# Patient Record
Sex: MALE | Race: WHITE | NOT HISPANIC OR LATINO | Employment: FULL TIME | URBAN - NONMETROPOLITAN AREA
[De-identification: names, ages, dates, MRNs, and addresses within clinical notes are randomized per-mention and may not be internally consistent; named-entity substitution may affect disease eponyms.]

---

## 2023-07-28 PROBLEM — E55.9 VITAMIN D DEFICIENCY: Status: ACTIVE | Noted: 2023-07-28

## 2023-07-28 PROBLEM — F31.9 BIPOLAR DISEASE, CHRONIC (MULTI): Status: ACTIVE | Noted: 2023-07-28

## 2023-07-28 PROBLEM — G40.909 EPILEPSY, UNSPECIFIED, NOT INTRACTABLE, WITHOUT STATUS EPILEPTICUS (MULTI): Status: ACTIVE | Noted: 2023-07-28

## 2023-07-28 PROBLEM — E87.1 HYPONATREMIA: Status: ACTIVE | Noted: 2023-07-28

## 2023-07-28 PROBLEM — F41.9 ANXIETY DISORDER: Status: ACTIVE | Noted: 2023-07-28

## 2023-08-22 ENCOUNTER — OFFICE VISIT (OUTPATIENT)
Dept: PRIMARY CARE | Facility: CLINIC | Age: 48
End: 2023-08-22
Payer: COMMERCIAL

## 2023-08-22 ENCOUNTER — LAB (OUTPATIENT)
Dept: LAB | Facility: LAB | Age: 48
End: 2023-08-22
Payer: COMMERCIAL

## 2023-08-22 VITALS
SYSTOLIC BLOOD PRESSURE: 145 MMHG | TEMPERATURE: 97.7 F | HEART RATE: 90 BPM | HEIGHT: 72 IN | OXYGEN SATURATION: 99 % | BODY MASS INDEX: 36.65 KG/M2 | DIASTOLIC BLOOD PRESSURE: 90 MMHG | WEIGHT: 270.6 LBS

## 2023-08-22 DIAGNOSIS — R94.31 ABNORMAL EKG: ICD-10-CM

## 2023-08-22 DIAGNOSIS — Z13.6 ENCOUNTER FOR SCREENING FOR CORONARY ARTERY DISEASE: ICD-10-CM

## 2023-08-22 DIAGNOSIS — F31.9 BIPOLAR DISEASE, CHRONIC (MULTI): ICD-10-CM

## 2023-08-22 DIAGNOSIS — Z00.00 ANNUAL PHYSICAL EXAM: Primary | ICD-10-CM

## 2023-08-22 DIAGNOSIS — G40.909 NONINTRACTABLE EPILEPSY WITHOUT STATUS EPILEPTICUS, UNSPECIFIED EPILEPSY TYPE (MULTI): ICD-10-CM

## 2023-08-22 DIAGNOSIS — Z00.00 ANNUAL PHYSICAL EXAM: ICD-10-CM

## 2023-08-22 DIAGNOSIS — I10 UNCONTROLLED HYPERTENSION: ICD-10-CM

## 2023-08-22 LAB
ALANINE AMINOTRANSFERASE (SGPT) (U/L) IN SER/PLAS: 26 U/L (ref 10–52)
ALBUMIN (G/DL) IN SER/PLAS: 4.5 G/DL (ref 3.4–5)
ALKALINE PHOSPHATASE (U/L) IN SER/PLAS: 68 U/L (ref 33–120)
ANION GAP IN SER/PLAS: 11 MMOL/L (ref 10–20)
ASPARTATE AMINOTRANSFERASE (SGOT) (U/L) IN SER/PLAS: 23 U/L (ref 9–39)
BASOPHILS (10*3/UL) IN BLOOD BY AUTOMATED COUNT: 0.04 X10E9/L (ref 0–0.1)
BASOPHILS/100 LEUKOCYTES IN BLOOD BY AUTOMATED COUNT: 0.7 % (ref 0–2)
BILIRUBIN TOTAL (MG/DL) IN SER/PLAS: 0.5 MG/DL (ref 0–1.2)
CALCIUM (MG/DL) IN SER/PLAS: 9 MG/DL (ref 8.6–10.3)
CARBON DIOXIDE, TOTAL (MMOL/L) IN SER/PLAS: 29 MMOL/L (ref 21–32)
CHLORIDE (MMOL/L) IN SER/PLAS: 98 MMOL/L (ref 98–107)
CHOLESTEROL (MG/DL) IN SER/PLAS: 204 MG/DL (ref 0–199)
CHOLESTEROL IN HDL (MG/DL) IN SER/PLAS: 66.3 MG/DL
CHOLESTEROL/HDL RATIO: 3.1
CREATININE (MG/DL) IN SER/PLAS: 0.76 MG/DL (ref 0.5–1.3)
EOSINOPHILS (10*3/UL) IN BLOOD BY AUTOMATED COUNT: 0.11 X10E9/L (ref 0–0.7)
EOSINOPHILS/100 LEUKOCYTES IN BLOOD BY AUTOMATED COUNT: 1.9 % (ref 0–6)
ERYTHROCYTE DISTRIBUTION WIDTH (RATIO) BY AUTOMATED COUNT: 12.4 % (ref 11.5–14.5)
ERYTHROCYTE MEAN CORPUSCULAR HEMOGLOBIN CONCENTRATION (G/DL) BY AUTOMATED: 34.1 G/DL (ref 32–36)
ERYTHROCYTE MEAN CORPUSCULAR VOLUME (FL) BY AUTOMATED COUNT: 92 FL (ref 80–100)
ERYTHROCYTES (10*6/UL) IN BLOOD BY AUTOMATED COUNT: 4.64 X10E12/L (ref 4.5–5.9)
ESTIMATED AVERAGE GLUCOSE FOR HBA1C: 105 MG/DL
GFR MALE: >90 ML/MIN/1.73M2
GLUCOSE (MG/DL) IN SER/PLAS: 109 MG/DL (ref 74–99)
HEMATOCRIT (%) IN BLOOD BY AUTOMATED COUNT: 42.8 % (ref 41–52)
HEMOGLOBIN (G/DL) IN BLOOD: 14.6 G/DL (ref 13.5–17.5)
HEMOGLOBIN A1C/HEMOGLOBIN TOTAL IN BLOOD: 5.3 %
IMMATURE GRANULOCYTES/100 LEUKOCYTES IN BLOOD BY AUTOMATED COUNT: 0.5 % (ref 0–0.9)
LDL: 114 MG/DL (ref 0–99)
LEUKOCYTES (10*3/UL) IN BLOOD BY AUTOMATED COUNT: 5.9 X10E9/L (ref 4.4–11.3)
LYMPHOCYTES (10*3/UL) IN BLOOD BY AUTOMATED COUNT: 1.19 X10E9/L (ref 1.2–4.8)
LYMPHOCYTES/100 LEUKOCYTES IN BLOOD BY AUTOMATED COUNT: 20 % (ref 13–44)
MAGNESIUM (MG/DL) IN SER/PLAS: 2.17 MG/DL (ref 1.6–2.4)
MONOCYTES (10*3/UL) IN BLOOD BY AUTOMATED COUNT: 0.47 X10E9/L (ref 0.1–1)
MONOCYTES/100 LEUKOCYTES IN BLOOD BY AUTOMATED COUNT: 7.9 % (ref 2–10)
NEUTROPHILS (10*3/UL) IN BLOOD BY AUTOMATED COUNT: 4.1 X10E9/L (ref 1.2–7.7)
NEUTROPHILS/100 LEUKOCYTES IN BLOOD BY AUTOMATED COUNT: 69 % (ref 40–80)
PLATELETS (10*3/UL) IN BLOOD AUTOMATED COUNT: 253 X10E9/L (ref 150–450)
POTASSIUM (MMOL/L) IN SER/PLAS: 4.4 MMOL/L (ref 3.5–5.3)
PROTEIN TOTAL: 7.2 G/DL (ref 6.4–8.2)
SODIUM (MMOL/L) IN SER/PLAS: 134 MMOL/L (ref 136–145)
THYROTROPIN (MIU/L) IN SER/PLAS BY DETECTION LIMIT <= 0.05 MIU/L: 2.3 MIU/L (ref 0.44–3.98)
TRIGLYCERIDE (MG/DL) IN SER/PLAS: 117 MG/DL (ref 0–149)
UREA NITROGEN (MG/DL) IN SER/PLAS: 12 MG/DL (ref 6–23)
VLDL: 23 MG/DL (ref 0–40)

## 2023-08-22 PROCEDURE — 99214 OFFICE O/P EST MOD 30 MIN: CPT | Performed by: INTERNAL MEDICINE

## 2023-08-22 PROCEDURE — 84443 ASSAY THYROID STIM HORMONE: CPT

## 2023-08-22 PROCEDURE — 83036 HEMOGLOBIN GLYCOSYLATED A1C: CPT

## 2023-08-22 PROCEDURE — 80061 LIPID PANEL: CPT

## 2023-08-22 PROCEDURE — 93000 ELECTROCARDIOGRAM COMPLETE: CPT | Performed by: INTERNAL MEDICINE

## 2023-08-22 PROCEDURE — 99396 PREV VISIT EST AGE 40-64: CPT | Performed by: INTERNAL MEDICINE

## 2023-08-22 PROCEDURE — 1036F TOBACCO NON-USER: CPT | Performed by: INTERNAL MEDICINE

## 2023-08-22 PROCEDURE — 3080F DIAST BP >= 90 MM HG: CPT | Performed by: INTERNAL MEDICINE

## 2023-08-22 PROCEDURE — 83735 ASSAY OF MAGNESIUM: CPT

## 2023-08-22 PROCEDURE — 3077F SYST BP >= 140 MM HG: CPT | Performed by: INTERNAL MEDICINE

## 2023-08-22 PROCEDURE — 84153 ASSAY OF PSA TOTAL: CPT

## 2023-08-22 PROCEDURE — 85025 COMPLETE CBC W/AUTO DIFF WBC: CPT

## 2023-08-22 PROCEDURE — 36415 COLL VENOUS BLD VENIPUNCTURE: CPT

## 2023-08-22 PROCEDURE — 80053 COMPREHEN METABOLIC PANEL: CPT

## 2023-08-22 RX ORDER — CARBAMAZEPINE 400 MG/1
400 TABLET, EXTENDED RELEASE ORAL 3 TIMES DAILY
Qty: 270 TABLET | Refills: 1 | Status: SHIPPED | OUTPATIENT
Start: 2023-08-22 | End: 2023-11-20 | Stop reason: SDUPTHER

## 2023-08-22 ASSESSMENT — ANXIETY QUESTIONNAIRES
GAD7 TOTAL SCORE: 0
6. BECOMING EASILY ANNOYED OR IRRITABLE: NOT AT ALL
1. FEELING NERVOUS, ANXIOUS, OR ON EDGE: NOT AT ALL
4. TROUBLE RELAXING: NOT AT ALL
7. FEELING AFRAID AS IF SOMETHING AWFUL MIGHT HAPPEN: NOT AT ALL
3. WORRYING TOO MUCH ABOUT DIFFERENT THINGS: NOT AT ALL
2. NOT BEING ABLE TO STOP OR CONTROL WORRYING: NOT AT ALL
5. BEING SO RESTLESS THAT IT IS HARD TO SIT STILL: NOT AT ALL

## 2023-08-22 ASSESSMENT — ENCOUNTER SYMPTOMS
LOSS OF SENSATION IN FEET: 0
DIARRHEA: 0
SORE THROAT: 0
UNEXPECTED WEIGHT CHANGE: 0
BLOOD IN STOOL: 0
DIZZINESS: 0
FATIGUE: 0
ARTHRALGIAS: 0
OCCASIONAL FEELINGS OF UNSTEADINESS: 0
ABDOMINAL PAIN: 0
PALPITATIONS: 0
BRUISES/BLEEDS EASILY: 0
WHEEZING: 0
FEVER: 0
COUGH: 0
SINUS PAIN: 0
HEADACHES: 0
DEPRESSION: 0
DIFFICULTY URINATING: 0

## 2023-08-22 ASSESSMENT — PATIENT HEALTH QUESTIONNAIRE - PHQ9
2. FEELING DOWN, DEPRESSED OR HOPELESS: NOT AT ALL
SUM OF ALL RESPONSES TO PHQ9 QUESTIONS 1 AND 2: 0
1. LITTLE INTEREST OR PLEASURE IN DOING THINGS: NOT AT ALL

## 2023-08-22 NOTE — PROGRESS NOTES
"Subjective   Patient ID: Clarence Aguilar is a 48 y.o. male who presents for Annual Exam and Med Refill.    \" Annual preventive visit  -Recent blood work reviewed from February 2023 borderline elevated liver enzymes hypercholesterolemia counseled about weight loss and low-carb diet  -Class II obesity worsening BMI patient counseled about weight loss low-carb diet exercise  -Vaccinations patient counseled about shingles vaccine patient declined at this time we will readdress again next appointment  -Counseled about the need for screening colonoscopy    Follow-up  Blood work reviewed  -Uncontrolled hypertension patient needs a goal blood pressure 120/80 patient declined treatment at this time  Counseled about obesity counseled about weight loss  EKG today sinus rhythm no ischemic changes nonspecific RSR changes in the anterior leads and the horizontal axis deviation talked about obesity and weight loss  Proceed with 2D echo  Need to proceed with cardiac scoring for further recommendation  Recheck patient blood pressure in 3 months  - Hyponatremia compensated continue with current fluid restriction continue with current medication  -\"Obesity counseled about BMI comes about weight loss  -Borderline high blood sugar controlled with diet continue monitoring, follow-up hemoglobin A1c  -Vitamin D very low 27 on vitamin D 5000 daily, follow-up vitamin D 11  -Anxiety and depression now controlled and not taking any medication no recurrence  -Bipolar depression compensated  -Seizures, controlled with carbamazepine no recurrence white count and blood counts normal range   Follow-up 3 months      Med Refill  Pertinent negatives include no abdominal pain, arthralgias, chest pain, congestion, coughing, fatigue, fever, headaches, rash or sore throat.          Review of Systems   Constitutional:  Negative for fatigue, fever and unexpected weight change.   HENT:  Negative for congestion, ear discharge, ear pain, mouth sores, sinus pain " "and sore throat.    Eyes:  Negative for visual disturbance.   Respiratory:  Negative for cough and wheezing.    Cardiovascular:  Negative for chest pain, palpitations and leg swelling.   Gastrointestinal:  Negative for abdominal pain, blood in stool and diarrhea.   Genitourinary:  Negative for difficulty urinating.   Musculoskeletal:  Negative for arthralgias.   Skin:  Negative for rash.   Neurological:  Negative for dizziness and headaches.   Hematological:  Does not bruise/bleed easily.   Psychiatric/Behavioral:  Negative for behavioral problems.    All other systems reviewed and are negative.      Objective   No results found for: \"HGBA1C\"   /90 (BP Location: Right arm, Patient Position: Sitting)   Pulse 90   Temp 36.5 °C (97.7 °F)   Ht 1.816 m (5' 11.5\")   Wt 123 kg (270 lb 9.6 oz)   SpO2 99%   BMI 37.22 kg/m²   Lab Results   Component Value Date    WBC 5.9 08/22/2023    HGB 14.6 08/22/2023    HCT 42.8 08/22/2023     08/22/2023    CHOL 204 (H) 08/22/2023    TRIG 117 08/22/2023    HDL 66.3 08/22/2023    ALT 26 08/22/2023    AST 23 08/22/2023     (L) 08/22/2023    K 4.4 08/22/2023    CL 98 08/22/2023    CREATININE 0.76 08/22/2023    BUN 12 08/22/2023    CO2 29 08/22/2023    TSH 2.30 08/22/2023     par   Physical Exam  Vitals and nursing note reviewed.   Constitutional:       Appearance: Normal appearance. He is obese.   HENT:      Head: Normocephalic.      Nose: Nose normal.   Eyes:      Conjunctiva/sclera: Conjunctivae normal.      Pupils: Pupils are equal, round, and reactive to light.   Cardiovascular:      Rate and Rhythm: Regular rhythm.      Heart sounds: No murmur heard.     Friction rub present. No gallop.   Pulmonary:      Effort: Pulmonary effort is normal.      Breath sounds: Normal breath sounds.   Abdominal:      General: Abdomen is flat.      Palpations: Abdomen is soft.   Musculoskeletal:      Cervical back: Neck supple.   Skin:     General: Skin is warm.      Findings: No " "rash.   Neurological:      General: No focal deficit present.      Mental Status: He is oriented to person, place, and time.   Psychiatric:         Mood and Affect: Mood normal.         Assessment/Plan   Clarence was seen today for annual exam and med refill.  Diagnoses and all orders for this visit:  Annual physical exam (Primary)  -     CBC and Auto Differential; Future  -     Comprehensive Metabolic Panel; Future  -     Hemoglobin A1C; Future  -     Lipid Panel; Future  -     Magnesium; Future  -     Prostate Specific Antigen; Future  -     TSH with reflex to Free T4 if abnormal; Future  -     ECG 12 lead  Nonintractable epilepsy without status epilepticus, unspecified epilepsy type (CMS/HCC)  -     carBAMazepine XR (TEGretol  XR) 400 mg 12 hr tablet; Take 1 tablet (400 mg) by mouth 3 times a day.  Bipolar disease, chronic (CMS/HCC)  Encounter for screening for coronary artery disease  -     CT cardiac scoring wo IV contrast; Future  -     ECG 12 lead  -     CT cardiac scoring wo IV contrast  Uncontrolled hypertension  -     Transthoracic Echo (TTE) Complete; Future  -     ECG 12 lead  Abnormal EKG  -     Transthoracic Echo (TTE) Complete; Future   \" Annual preventive visit  -Recent blood work reviewed from February 2023 borderline elevated liver enzymes hypercholesterolemia counseled about weight loss and low-carb diet  -Class II obesity worsening BMI patient counseled about weight loss low-carb diet exercise  -Vaccinations patient counseled about shingles vaccine patient declined at this time we will readdress again next appointment  -Counseled about the need for screening colonoscopy    Follow-up  Blood work reviewed  -Uncontrolled hypertension patient needs a goal blood pressure 120/80 patient declined treatment at this time  Counseled about obesity counseled about weight loss  EKG today sinus rhythm no ischemic changes nonspecific RSR changes in the anterior leads and the horizontal axis deviation talked about " "obesity and weight loss  Proceed with 2D echo  Need to proceed with cardiac scoring for further recommendation  Recheck patient blood pressure in 3 months  - Hyponatremia compensated continue with current fluid restriction continue with current medication  -\"Obesity counseled about BMI comes about weight loss  -Borderline high blood sugar controlled with diet continue monitoring, follow-up hemoglobin A1c  -Vitamin D very low 27 on vitamin D 5000 daily, follow-up vitamin D 11  -Anxiety and depression now controlled and not taking any medication no recurrence  -Bipolar depression compensated  -Seizures, controlled with carbamazepine no recurrence white count and blood counts normal range   Follow-up 3 months    "

## 2023-08-23 LAB — PROSTATE SPECIFIC AG (NG/ML) IN SER/PLAS: 1.14 NG/ML (ref 0–4)

## 2023-11-20 ENCOUNTER — OFFICE VISIT (OUTPATIENT)
Dept: PRIMARY CARE | Facility: CLINIC | Age: 48
End: 2023-11-20
Payer: COMMERCIAL

## 2023-11-20 VITALS
DIASTOLIC BLOOD PRESSURE: 80 MMHG | BODY MASS INDEX: 35.18 KG/M2 | HEART RATE: 92 BPM | TEMPERATURE: 98 F | WEIGHT: 255.8 LBS | SYSTOLIC BLOOD PRESSURE: 120 MMHG | OXYGEN SATURATION: 98 %

## 2023-11-20 DIAGNOSIS — I10 UNCONTROLLED HYPERTENSION: Primary | ICD-10-CM

## 2023-11-20 DIAGNOSIS — E66.9 OBESITY, CLASS II, BMI 35-39.9: ICD-10-CM

## 2023-11-20 DIAGNOSIS — E78.00 HYPERCHOLESTEROLEMIA: ICD-10-CM

## 2023-11-20 DIAGNOSIS — G40.909 NONINTRACTABLE EPILEPSY WITHOUT STATUS EPILEPTICUS, UNSPECIFIED EPILEPSY TYPE (MULTI): ICD-10-CM

## 2023-11-20 DIAGNOSIS — F31.9 BIPOLAR DISEASE, CHRONIC (MULTI): ICD-10-CM

## 2023-11-20 DIAGNOSIS — E87.1 HYPONATREMIA: ICD-10-CM

## 2023-11-20 PROCEDURE — 1036F TOBACCO NON-USER: CPT | Performed by: INTERNAL MEDICINE

## 2023-11-20 PROCEDURE — 3074F SYST BP LT 130 MM HG: CPT | Performed by: INTERNAL MEDICINE

## 2023-11-20 PROCEDURE — 3079F DIAST BP 80-89 MM HG: CPT | Performed by: INTERNAL MEDICINE

## 2023-11-20 PROCEDURE — 99214 OFFICE O/P EST MOD 30 MIN: CPT | Performed by: INTERNAL MEDICINE

## 2023-11-20 RX ORDER — CARBAMAZEPINE 400 MG/1
400 TABLET, EXTENDED RELEASE ORAL 3 TIMES DAILY
Qty: 270 TABLET | Refills: 1 | Status: SHIPPED | OUTPATIENT
Start: 2023-11-20 | End: 2024-05-20 | Stop reason: SDUPTHER

## 2023-11-20 ASSESSMENT — ENCOUNTER SYMPTOMS
ABDOMINAL PAIN: 0
ARTHRALGIAS: 0
HEADACHES: 0
BLOOD IN STOOL: 0
SINUS PAIN: 0
PALPITATIONS: 0
BRUISES/BLEEDS EASILY: 0
FATIGUE: 0
DIARRHEA: 0
WHEEZING: 0
DIFFICULTY URINATING: 0
DIZZINESS: 0
SEIZURES: 1
COUGH: 0
SORE THROAT: 0
FEVER: 0
UNEXPECTED WEIGHT CHANGE: 0

## 2023-11-20 NOTE — PROGRESS NOTES
Subjective   Patient ID: Clarence Aguilar is a 48 y.o. male who presents for Seizures, Results, Flu Vaccine (declined), and Med Refill.    -Abnormal lab results discussed with patient findings and plan of care  Borderline hypercholesterolemia with normal cardiac calcium score patient to continue with low-carb diet normal hemoglobin A1c  - Uncontrolled hypertension patient needs a goal blood pressure 120/80 patient declined treatment at this time  Blood work at within normal range  Blood pressure records from home 120/80 patient blood pressure improved after resting  - Obesity patient lost about 16 pounds since last visit continue with diet exercise and weight loss BMI now 31  -Cardiac calcium score 0 patient reassured to continue low-carb diet exercise and weight loss  - Echocardiogram within normal limits no significant disease patient reassured  - Hyponatremia compensated continue with current fluid restriction continue with current medication  -Borderline high blood sugar, hemoglobin A1c 5.3 normal range continue low-carb diet patient reassured  -Vitamin D very low 27 on vitamin D 5000 daily, follow-up vitamin D 11  -Anxiety and depression now controlled and not taking any medication no recurrence  -Bipolar depression compensated continue on current medication  -Seizures, controlled with carbamazepine no recurrence white count and blood counts normal range   Follow-up 6 months      Seizures   Pertinent negatives include no headaches, no visual disturbance, no sore throat, no chest pain, no cough and no diarrhea.   Med Refill  Pertinent negatives include no abdominal pain, arthralgias, chest pain, congestion, coughing, fatigue, fever, headaches, rash or sore throat.          Review of Systems   Constitutional:  Negative for fatigue, fever and unexpected weight change.   HENT:  Negative for congestion, ear discharge, ear pain, mouth sores, sinus pain and sore throat.    Eyes:  Negative for visual disturbance.    Respiratory:  Negative for cough and wheezing.    Cardiovascular:  Negative for chest pain, palpitations and leg swelling.   Gastrointestinal:  Negative for abdominal pain, blood in stool and diarrhea.   Genitourinary:  Negative for difficulty urinating.   Musculoskeletal:  Negative for arthralgias.   Skin:  Negative for rash.   Neurological:  Positive for seizures. Negative for dizziness and headaches.   Hematological:  Does not bruise/bleed easily.   Psychiatric/Behavioral:  Negative for behavioral problems.    All other systems reviewed and are negative.      Objective   Lab Results   Component Value Date    HGBA1C 5.3 08/22/2023      /80   Pulse 92   Temp 36.7 °C (98 °F)   Wt 116 kg (255 lb 12.8 oz)   SpO2 98%   BMI 35.18 kg/m²   Lab Results   Component Value Date    WBC 5.9 08/22/2023    HGB 14.6 08/22/2023    HCT 42.8 08/22/2023     08/22/2023    CHOL 204 (H) 08/22/2023    TRIG 117 08/22/2023    HDL 66.3 08/22/2023    ALT 26 08/22/2023    AST 23 08/22/2023     (L) 08/22/2023    K 4.4 08/22/2023    CL 98 08/22/2023    CREATININE 0.76 08/22/2023    BUN 12 08/22/2023    CO2 29 08/22/2023    TSH 2.30 08/22/2023    PSA 1.14 08/22/2023    HGBA1C 5.3 08/22/2023     par   Physical Exam  Vitals and nursing note reviewed.   Constitutional:       Appearance: Normal appearance.   HENT:      Head: Normocephalic.      Nose: Nose normal.   Eyes:      Conjunctiva/sclera: Conjunctivae normal.      Pupils: Pupils are equal, round, and reactive to light.   Cardiovascular:      Rate and Rhythm: Regular rhythm.   Pulmonary:      Effort: Pulmonary effort is normal.      Breath sounds: Normal breath sounds.   Abdominal:      General: Abdomen is flat.      Palpations: Abdomen is soft.   Musculoskeletal:      Cervical back: Neck supple.   Skin:     General: Skin is warm.   Neurological:      General: No focal deficit present.      Mental Status: He is oriented to person, place, and time.   Psychiatric:          Mood and Affect: Mood normal.         Assessment/Plan   Clarence was seen today for seizures, results, flu vaccine and med refill.  Diagnoses and all orders for this visit:  Uncontrolled hypertension (Primary)  Nonintractable epilepsy without status epilepticus, unspecified epilepsy type (CMS/HCC)  -     carBAMazepine XR (TEGretol  XR) 400 mg 12 hr tablet; Take 1 tablet (400 mg) by mouth 3 times a day.  Bipolar disease, chronic (CMS/HCC)  Hyponatremia  Hypercholesterolemia  Obesity, Class II, BMI 35-39.9  Other orders  -     Follow Up In Primary Care - Health Maintenance; Future   -Abnormal lab results discussed with patient findings and plan of care  Borderline hypercholesterolemia with normal cardiac calcium score patient to continue with low-carb diet normal hemoglobin A1c  - Uncontrolled hypertension patient needs a goal blood pressure 120/80 patient declined treatment at this time  Blood work at within normal range  Blood pressure records from home 120/80 patient blood pressure improved after resting  - Obesity patient lost about 16 pounds since last visit continue with diet exercise and weight loss BMI now 31  -Cardiac calcium score 0 patient reassured to continue low-carb diet exercise and weight loss  - Echocardiogram within normal limits no significant disease patient reassured  - Hyponatremia compensated continue with current fluid restriction continue with current medication  -Borderline high blood sugar, hemoglobin A1c 5.3 normal range continue low-carb diet patient reassured  -Vitamin D very low 27 on vitamin D 5000 daily, follow-up vitamin D 11  -Anxiety and depression now controlled and not taking any medication no recurrence  -Bipolar depression compensated continue on current medication  -Seizures, controlled with carbamazepine no recurrence white count and blood counts normal range   Follow-up 6 months

## 2024-05-20 ENCOUNTER — OFFICE VISIT (OUTPATIENT)
Dept: PRIMARY CARE | Facility: CLINIC | Age: 49
End: 2024-05-20
Payer: COMMERCIAL

## 2024-05-20 VITALS
OXYGEN SATURATION: 97 % | SYSTOLIC BLOOD PRESSURE: 158 MMHG | BODY MASS INDEX: 36.98 KG/M2 | WEIGHT: 273 LBS | HEART RATE: 102 BPM | DIASTOLIC BLOOD PRESSURE: 102 MMHG | TEMPERATURE: 97.8 F | HEIGHT: 72 IN

## 2024-05-20 DIAGNOSIS — E78.00 HYPERCHOLESTEROLEMIA: ICD-10-CM

## 2024-05-20 DIAGNOSIS — Z00.00 ANNUAL PHYSICAL EXAM: Primary | ICD-10-CM

## 2024-05-20 DIAGNOSIS — G40.909 NONINTRACTABLE EPILEPSY WITHOUT STATUS EPILEPTICUS, UNSPECIFIED EPILEPSY TYPE (MULTI): ICD-10-CM

## 2024-05-20 DIAGNOSIS — F31.9 BIPOLAR DISEASE, CHRONIC (MULTI): ICD-10-CM

## 2024-05-20 DIAGNOSIS — I10 UNCONTROLLED HYPERTENSION: ICD-10-CM

## 2024-05-20 PROCEDURE — 1036F TOBACCO NON-USER: CPT | Performed by: INTERNAL MEDICINE

## 2024-05-20 PROCEDURE — 99396 PREV VISIT EST AGE 40-64: CPT | Performed by: INTERNAL MEDICINE

## 2024-05-20 PROCEDURE — 3077F SYST BP >= 140 MM HG: CPT | Performed by: INTERNAL MEDICINE

## 2024-05-20 PROCEDURE — 99214 OFFICE O/P EST MOD 30 MIN: CPT | Performed by: INTERNAL MEDICINE

## 2024-05-20 PROCEDURE — 3080F DIAST BP >= 90 MM HG: CPT | Performed by: INTERNAL MEDICINE

## 2024-05-20 RX ORDER — CARBAMAZEPINE 400 MG/1
400 TABLET, EXTENDED RELEASE ORAL 3 TIMES DAILY
Qty: 270 TABLET | Refills: 1 | Status: SHIPPED | OUTPATIENT
Start: 2024-05-20

## 2024-05-20 ASSESSMENT — ANXIETY QUESTIONNAIRES
5. BEING SO RESTLESS THAT IT IS HARD TO SIT STILL: NOT AT ALL
1. FEELING NERVOUS, ANXIOUS, OR ON EDGE: NOT AT ALL
7. FEELING AFRAID AS IF SOMETHING AWFUL MIGHT HAPPEN: NOT AT ALL
2. NOT BEING ABLE TO STOP OR CONTROL WORRYING: NOT AT ALL
4. TROUBLE RELAXING: NOT AT ALL
3. WORRYING TOO MUCH ABOUT DIFFERENT THINGS: NOT AT ALL
GAD7 TOTAL SCORE: 0
6. BECOMING EASILY ANNOYED OR IRRITABLE: NOT AT ALL

## 2024-05-20 ASSESSMENT — ENCOUNTER SYMPTOMS
ABDOMINAL PAIN: 0
ARTHRALGIAS: 0
UNEXPECTED WEIGHT CHANGE: 0
DIFFICULTY URINATING: 0
BRUISES/BLEEDS EASILY: 0
COUGH: 0
SORE THROAT: 0
DIZZINESS: 0
BLOOD IN STOOL: 0
HEADACHES: 0
WHEEZING: 0
SEIZURES: 1
DIARRHEA: 0
PALPITATIONS: 0
FATIGUE: 0
SINUS PAIN: 0
FEVER: 0

## 2024-05-20 ASSESSMENT — PATIENT HEALTH QUESTIONNAIRE - PHQ9
1. LITTLE INTEREST OR PLEASURE IN DOING THINGS: NOT AT ALL
SUM OF ALL RESPONSES TO PHQ9 QUESTIONS 1 AND 2: 0
2. FEELING DOWN, DEPRESSED OR HOPELESS: NOT AT ALL

## 2024-05-20 NOTE — PROGRESS NOTES
Subjective   Patient ID: Clarence Aguilar is a 48 y.o. male who presents for Annual Exam, Seizures (epilepsy), and Med Refill.    - Abnormal lab results discussed with patient findings and plan of care  Annual preventive visit  - Vaccinations patient declined Shingrix vaccine  - Counseled on screening for colon cancer patient declined aware of risk and benefit  -Coronary artery disease screening coronary calcium score 0 continue with weight loss and low-carb diet  - Screen for depression patient with underlying bipolar depression controlled with current medication  - Morbid obesity patient counseled about weight loss and low-carb diet    Follow-up  -Uncontrolled hypertension patient counseled about weight loss low-carb diet and exercise patient blood pressure record from home range 120/80  Continue with weight loss to reevaluate patient in 6 months  - -Cardiac calcium score 0 patient reassured to continue low-carb diet exercise and weight loss  - Echocardiogram within normal limits no significant disease patient reassured  - Hyponatremia compensated continue with current fluid restriction continue with current medication  -Borderline high blood sugar, hemoglobin A1c 5.3 normal range continue low-carb diet patient reassured repeat hemoglobin A1c before next appointment  -Vitamin D very low 27 on vitamin D 5000 daily, follow-up vitamin D in 6 months  -Anxiety and depression now controlled and not taking any medication no recurrence  -Bipolar depression compensated continue on current medication  -Seizures, controlled with carbamazepine no recurrence white count and blood counts normal range   Follow-up 6 months         Seizures   Pertinent negatives include no headaches, no visual disturbance, no sore throat, no chest pain, no cough and no diarrhea.   Med Refill  Pertinent negatives include no abdominal pain, arthralgias, chest pain, congestion, coughing, fatigue, fever, headaches, rash or sore throat.          Review  "of Systems   Constitutional:  Negative for fatigue, fever and unexpected weight change.   HENT:  Negative for congestion, ear discharge, ear pain, mouth sores, sinus pain and sore throat.    Eyes:  Negative for visual disturbance.   Respiratory:  Negative for cough and wheezing.    Cardiovascular:  Negative for chest pain, palpitations and leg swelling.   Gastrointestinal:  Negative for abdominal pain, blood in stool and diarrhea.   Genitourinary:  Negative for difficulty urinating.   Musculoskeletal:  Negative for arthralgias.   Skin:  Negative for rash.   Neurological:  Positive for seizures. Negative for dizziness and headaches.   Hematological:  Does not bruise/bleed easily.   Psychiatric/Behavioral:  Negative for behavioral problems.    All other systems reviewed and are negative.      Objective   Lab Results   Component Value Date    HGBA1C 5.3 08/22/2023      BP (!) 158/102   Pulse 102   Temp 36.6 °C (97.8 °F)   Ht 1.816 m (5' 11.5\")   Wt 124 kg (273 lb)   SpO2 97%   BMI 37.55 kg/m²   Lab Results   Component Value Date    WBC 5.9 08/22/2023    HGB 14.6 08/22/2023    HCT 42.8 08/22/2023     08/22/2023    CHOL 204 (H) 08/22/2023    TRIG 117 08/22/2023    HDL 66.3 08/22/2023    ALT 26 08/22/2023    AST 23 08/22/2023     (L) 08/22/2023    K 4.4 08/22/2023    CL 98 08/22/2023    CREATININE 0.76 08/22/2023    BUN 12 08/22/2023    CO2 29 08/22/2023    TSH 2.30 08/22/2023    PSA 1.14 08/22/2023    HGBA1C 5.3 08/22/2023     par   Physical Exam  Vitals and nursing note reviewed.   Constitutional:       Appearance: Normal appearance.   HENT:      Head: Normocephalic.      Nose: Nose normal.   Eyes:      Conjunctiva/sclera: Conjunctivae normal.      Pupils: Pupils are equal, round, and reactive to light.   Cardiovascular:      Rate and Rhythm: Regular rhythm.   Pulmonary:      Effort: Pulmonary effort is normal.      Breath sounds: Normal breath sounds.   Abdominal:      General: Abdomen is flat.      " Palpations: Abdomen is soft.   Musculoskeletal:      Cervical back: Neck supple.   Skin:     General: Skin is warm.   Neurological:      General: No focal deficit present.      Mental Status: He is oriented to person, place, and time.   Psychiatric:         Mood and Affect: Mood normal.         Assessment/Plan   Clarence was seen today for annual exam, seizures and med refill.  Diagnoses and all orders for this visit:  Annual physical exam (Primary)  -     CBC and Auto Differential; Future  -     Comprehensive Metabolic Panel; Future  -     Hemoglobin A1C; Future  -     Lipid Panel; Future  -     TSH with reflex to Free T4 if abnormal; Future  -     Prostate Specific Antigen; Future  -     Vitamin D 25-Hydroxy,Total (for eval of Vitamin D levels); Future  Nonintractable epilepsy without status epilepticus, unspecified epilepsy type (Multi)  -     carBAMazepine XR (TEGretol  XR) 400 mg 12 hr tablet; Take 1 tablet (400 mg) by mouth 3 times a day.  Bipolar disease, chronic (Multi)  Uncontrolled hypertension  Hypercholesterolemia  Other orders  -     Follow Up In Primary Care - Health Maintenance  -     Follow Up In Primary Care - Established; Future   - Abnormal lab results discussed with patient findings and plan of care  Annual preventive visit  - Vaccinations patient declined Shingrix vaccine  - Counseled on screening for colon cancer patient declined aware of risk and benefit  -Coronary artery disease screening coronary calcium score 0 continue with weight loss and low-carb diet  - Screen for depression patient with underlying bipolar depression controlled with current medication  - Morbid obesity patient counseled about weight loss and low-carb diet    Follow-up  -Uncontrolled hypertension patient counseled about weight loss low-carb diet and exercise patient blood pressure record from home range 120/80  Continue with weight loss to reevaluate patient in 6 months  - -Cardiac calcium score 0 patient reassured to  continue low-carb diet exercise and weight loss  - Echocardiogram within normal limits no significant disease patient reassured  - Hyponatremia compensated continue with current fluid restriction continue with current medication  -Borderline high blood sugar, hemoglobin A1c 5.3 normal range continue low-carb diet patient reassured repeat hemoglobin A1c before next appointment  -Vitamin D very low 27 on vitamin D 5000 daily, follow-up vitamin D in 6 months  -Anxiety and depression now controlled and not taking any medication no recurrence  -Bipolar depression compensated continue on current medication  -Seizures, controlled with carbamazepine no recurrence white count and blood counts normal range   Follow-up 6 months

## 2024-11-20 ENCOUNTER — LAB (OUTPATIENT)
Dept: LAB | Facility: LAB | Age: 49
End: 2024-11-20
Payer: COMMERCIAL

## 2024-11-20 ENCOUNTER — APPOINTMENT (OUTPATIENT)
Dept: PRIMARY CARE | Facility: CLINIC | Age: 49
End: 2024-11-20
Payer: COMMERCIAL

## 2024-11-20 VITALS
HEART RATE: 100 BPM | BODY MASS INDEX: 37.82 KG/M2 | WEIGHT: 275 LBS | OXYGEN SATURATION: 97 % | TEMPERATURE: 94.8 F | SYSTOLIC BLOOD PRESSURE: 125 MMHG | DIASTOLIC BLOOD PRESSURE: 90 MMHG

## 2024-11-20 DIAGNOSIS — I10 UNCONTROLLED HYPERTENSION: ICD-10-CM

## 2024-11-20 DIAGNOSIS — E78.00 HYPERCHOLESTEROLEMIA: Primary | ICD-10-CM

## 2024-11-20 DIAGNOSIS — G40.909 NONINTRACTABLE EPILEPSY WITHOUT STATUS EPILEPTICUS, UNSPECIFIED EPILEPSY TYPE (MULTI): ICD-10-CM

## 2024-11-20 DIAGNOSIS — E66.812 OBESITY, CLASS II, BMI 35-39.9: ICD-10-CM

## 2024-11-20 DIAGNOSIS — Z00.00 ANNUAL PHYSICAL EXAM: ICD-10-CM

## 2024-11-20 DIAGNOSIS — E87.1 HYPONATREMIA: ICD-10-CM

## 2024-11-20 LAB
25(OH)D3 SERPL-MCNC: 54 NG/ML (ref 30–100)
ALBUMIN SERPL BCP-MCNC: 4.5 G/DL (ref 3.4–5)
ALP SERPL-CCNC: 69 U/L (ref 33–120)
ALT SERPL W P-5'-P-CCNC: 30 U/L (ref 10–52)
ANION GAP SERPL CALC-SCNC: 11 MMOL/L (ref 10–20)
AST SERPL W P-5'-P-CCNC: 21 U/L (ref 9–39)
BASOPHILS # BLD AUTO: 0.05 X10*3/UL (ref 0–0.1)
BASOPHILS NFR BLD AUTO: 0.8 %
BILIRUB SERPL-MCNC: 0.4 MG/DL (ref 0–1.2)
BUN SERPL-MCNC: 15 MG/DL (ref 6–23)
CALCIUM SERPL-MCNC: 9 MG/DL (ref 8.6–10.3)
CHLORIDE SERPL-SCNC: 99 MMOL/L (ref 98–107)
CHOLEST SERPL-MCNC: 197 MG/DL (ref 0–199)
CHOLESTEROL/HDL RATIO: 2.9
CO2 SERPL-SCNC: 29 MMOL/L (ref 21–32)
CREAT SERPL-MCNC: 0.81 MG/DL (ref 0.5–1.3)
EGFRCR SERPLBLD CKD-EPI 2021: >90 ML/MIN/1.73M*2
EOSINOPHIL # BLD AUTO: 0.12 X10*3/UL (ref 0–0.7)
EOSINOPHIL NFR BLD AUTO: 2 %
ERYTHROCYTE [DISTWIDTH] IN BLOOD BY AUTOMATED COUNT: 12.1 % (ref 11.5–14.5)
EST. AVERAGE GLUCOSE BLD GHB EST-MCNC: 114 MG/DL
GLUCOSE SERPL-MCNC: 117 MG/DL (ref 74–99)
HBA1C MFR BLD: 5.6 %
HCT VFR BLD AUTO: 43.6 % (ref 41–52)
HDLC SERPL-MCNC: 66.8 MG/DL
HGB BLD-MCNC: 14.6 G/DL (ref 13.5–17.5)
IMM GRANULOCYTES # BLD AUTO: 0.03 X10*3/UL (ref 0–0.7)
IMM GRANULOCYTES NFR BLD AUTO: 0.5 % (ref 0–0.9)
LDLC SERPL CALC-MCNC: 113 MG/DL
LYMPHOCYTES # BLD AUTO: 0.99 X10*3/UL (ref 1.2–4.8)
LYMPHOCYTES NFR BLD AUTO: 16.2 %
MCH RBC QN AUTO: 30.7 PG (ref 26–34)
MCHC RBC AUTO-ENTMCNC: 33.5 G/DL (ref 32–36)
MCV RBC AUTO: 92 FL (ref 80–100)
MONOCYTES # BLD AUTO: 0.39 X10*3/UL (ref 0.1–1)
MONOCYTES NFR BLD AUTO: 6.4 %
NEUTROPHILS # BLD AUTO: 4.52 X10*3/UL (ref 1.2–7.7)
NEUTROPHILS NFR BLD AUTO: 74.1 %
NON HDL CHOLESTEROL: 130 MG/DL (ref 0–149)
NRBC BLD-RTO: 0 /100 WBCS (ref 0–0)
PLATELET # BLD AUTO: 257 X10*3/UL (ref 150–450)
POTASSIUM SERPL-SCNC: 4.2 MMOL/L (ref 3.5–5.3)
PROT SERPL-MCNC: 7.5 G/DL (ref 6.4–8.2)
PSA SERPL-MCNC: 0.79 NG/ML
RBC # BLD AUTO: 4.75 X10*6/UL (ref 4.5–5.9)
SODIUM SERPL-SCNC: 135 MMOL/L (ref 136–145)
TRIGL SERPL-MCNC: 87 MG/DL (ref 0–149)
TSH SERPL-ACNC: 1.86 MIU/L (ref 0.44–3.98)
VLDL: 17 MG/DL (ref 0–40)
WBC # BLD AUTO: 6.1 X10*3/UL (ref 4.4–11.3)

## 2024-11-20 PROCEDURE — 99214 OFFICE O/P EST MOD 30 MIN: CPT | Performed by: INTERNAL MEDICINE

## 2024-11-20 PROCEDURE — 3080F DIAST BP >= 90 MM HG: CPT | Performed by: INTERNAL MEDICINE

## 2024-11-20 PROCEDURE — 1036F TOBACCO NON-USER: CPT | Performed by: INTERNAL MEDICINE

## 2024-11-20 PROCEDURE — 36415 COLL VENOUS BLD VENIPUNCTURE: CPT

## 2024-11-20 PROCEDURE — 83036 HEMOGLOBIN GLYCOSYLATED A1C: CPT

## 2024-11-20 PROCEDURE — 82306 VITAMIN D 25 HYDROXY: CPT

## 2024-11-20 PROCEDURE — 84153 ASSAY OF PSA TOTAL: CPT

## 2024-11-20 PROCEDURE — 3074F SYST BP LT 130 MM HG: CPT | Performed by: INTERNAL MEDICINE

## 2024-11-20 RX ORDER — CARBAMAZEPINE 400 MG/1
400 TABLET, EXTENDED RELEASE ORAL 3 TIMES DAILY
Qty: 270 TABLET | Refills: 1 | Status: SHIPPED | OUTPATIENT
Start: 2024-11-20

## 2024-11-20 ASSESSMENT — ENCOUNTER SYMPTOMS
SEIZURES: 1
WHEEZING: 0
COUGH: 0
UNEXPECTED WEIGHT CHANGE: 0
FEVER: 0
DIARRHEA: 0
BRUISES/BLEEDS EASILY: 0
PALPITATIONS: 0
ARTHRALGIAS: 0
DIFFICULTY URINATING: 0
DIZZINESS: 0
SINUS PAIN: 0
FATIGUE: 0
SORE THROAT: 0
HEADACHES: 0
ABDOMINAL PAIN: 0
BLOOD IN STOOL: 0

## 2024-11-20 NOTE — PROGRESS NOTES
Subjective   Patient ID: Clarence Aguilar is a 49 y.o. male who presents for Seizures (Epilepsy ), Med Refill, and Immunizations (Declined flu vaccine).    -Uncontrolled hypertension patient counseled about weight loss low-carb diet and exercise patient blood pressure record from home range 120/80  Continue with weight loss to reevaluate patient in 6 months.  Exercise.  - -Cardiac calcium score 0 patient reassured to continue low-carb diet exercise and weight loss.  - Echocardiogram within normal limits no significant disease patient reassured.  - Hyponatremia compensated continue with current fluid restriction continue with current medication  -Borderline high blood sugar, hemoglobin A1c 5.3 normal range continue low-carb diet patient reassured repeat hemoglobin A1c before next appointment  -Vitamin D very low 27 on vitamin D 5000 daily, follow-up vitamin D in 6 months as a scheduled  -Anxiety and depression now controlled and not taking any medication no recurrence  -Bipolar depression compensated continue on current medication  -Seizures, controlled with carbamazepine no recurrence white count and blood counts normal range   Follow-up 6 months physical exam         Seizures   Pertinent negatives include no headaches, no visual disturbance, no sore throat, no chest pain, no cough and no diarrhea.   Med Refill  Pertinent negatives include no abdominal pain, arthralgias, chest pain, congestion, coughing, fatigue, fever, headaches, rash or sore throat.          Review of Systems   Constitutional:  Negative for fatigue, fever and unexpected weight change.   HENT:  Negative for congestion, ear discharge, ear pain, mouth sores, sinus pain and sore throat.    Eyes:  Negative for visual disturbance.   Respiratory:  Negative for cough and wheezing.    Cardiovascular:  Negative for chest pain, palpitations and leg swelling.   Gastrointestinal:  Negative for abdominal pain, blood in stool and diarrhea.   Genitourinary:   Negative for difficulty urinating.   Musculoskeletal:  Negative for arthralgias.   Skin:  Negative for rash.   Neurological:  Positive for seizures. Negative for dizziness and headaches.   Hematological:  Does not bruise/bleed easily.   Psychiatric/Behavioral:  Negative for behavioral problems.    All other systems reviewed and are negative.      Objective   Lab Results   Component Value Date    HGBA1C 5.3 08/22/2023      /90   Pulse 100   Temp 34.9 °C (94.8 °F)   Wt 125 kg (275 lb)   SpO2 97%   BMI 37.82 kg/m²     Physical Exam  Vitals and nursing note reviewed.   Constitutional:       Appearance: Normal appearance.   HENT:      Head: Normocephalic.      Nose: Nose normal.   Eyes:      Conjunctiva/sclera: Conjunctivae normal.      Pupils: Pupils are equal, round, and reactive to light.   Cardiovascular:      Rate and Rhythm: Regular rhythm.   Pulmonary:      Effort: Pulmonary effort is normal.      Breath sounds: Normal breath sounds.   Abdominal:      General: Abdomen is flat.      Palpations: Abdomen is soft.   Musculoskeletal:      Cervical back: Neck supple.   Skin:     General: Skin is warm.   Neurological:      General: No focal deficit present.      Mental Status: He is oriented to person, place, and time.   Psychiatric:         Mood and Affect: Mood normal.       Lab Results   Component Value Date    WBC 5.9 08/22/2023    HGB 14.6 08/22/2023    HCT 42.8 08/22/2023     08/22/2023    CHOL 204 (H) 08/22/2023    TRIG 117 08/22/2023    HDL 66.3 08/22/2023    ALT 26 08/22/2023    AST 23 08/22/2023     (L) 08/22/2023    K 4.4 08/22/2023    CL 98 08/22/2023    CREATININE 0.76 08/22/2023    BUN 12 08/22/2023    CO2 29 08/22/2023    TSH 2.30 08/22/2023    PSA 1.14 08/22/2023    HGBA1C 5.3 08/22/2023     par   Assessment/Plan   Clarence was seen today for seizures, med refill and immunizations.  Diagnoses and all orders for this visit:  Hypercholesterolemia (Primary)  Uncontrolled  hypertension  Nonintractable epilepsy without status epilepticus, unspecified epilepsy type (Multi)  -     carBAMazepine XR (TEGretol  XR) 400 mg 12 hr tablet; Take 1 tablet (400 mg) by mouth 3 times a day.  Hyponatremia  Obesity, Class II, BMI 35-39.9  Other orders  -     Follow Up In Primary Care - Health Maintenance; Future   Uncontrolled hypertension patient counseled about weight loss low-carb diet and exercise patient blood pressure record from home range 120/80  Continue with weight loss to reevaluate patient in 6 months.  Exercise.  - -Cardiac calcium score 0 patient reassured to continue low-carb diet exercise and weight loss.  - Echocardiogram within normal limits no significant disease patient reassured.  - Hyponatremia compensated continue with current fluid restriction continue with current medication  -Borderline high blood sugar, hemoglobin A1c 5.3 normal range continue low-carb diet patient reassured repeat hemoglobin A1c before next appointment  -Vitamin D very low 27 on vitamin D 5000 daily, follow-up vitamin D in 6 months as a scheduled  -Anxiety and depression now controlled and not taking any medication no recurrence  -Bipolar depression compensated continue on current medication  -Seizures, controlled with carbamazepine no recurrence white count and blood counts normal range   Follow-up 6 months physical exam

## 2025-05-21 ENCOUNTER — APPOINTMENT (OUTPATIENT)
Dept: PRIMARY CARE | Facility: CLINIC | Age: 50
End: 2025-05-21
Payer: COMMERCIAL

## 2025-05-21 VITALS
TEMPERATURE: 97.5 F | BODY MASS INDEX: 36.73 KG/M2 | OXYGEN SATURATION: 98 % | WEIGHT: 256.6 LBS | HEART RATE: 86 BPM | DIASTOLIC BLOOD PRESSURE: 100 MMHG | HEIGHT: 70 IN | SYSTOLIC BLOOD PRESSURE: 148 MMHG

## 2025-05-21 DIAGNOSIS — I10 HYPERTENSION, UNSPECIFIED TYPE: ICD-10-CM

## 2025-05-21 DIAGNOSIS — R73.09 ABNORMAL BLOOD SUGAR: ICD-10-CM

## 2025-05-21 DIAGNOSIS — F31.9 BIPOLAR AFFECTIVE DISORDER, REMISSION STATUS UNSPECIFIED (MULTI): ICD-10-CM

## 2025-05-21 DIAGNOSIS — Z12.5 SCREENING FOR PROSTATE CANCER: ICD-10-CM

## 2025-05-21 DIAGNOSIS — R53.83 OTHER FATIGUE: ICD-10-CM

## 2025-05-21 DIAGNOSIS — Z00.00 ANNUAL PHYSICAL EXAM: Primary | ICD-10-CM

## 2025-05-21 DIAGNOSIS — Z11.59 NEED FOR HEPATITIS C SCREENING TEST: ICD-10-CM

## 2025-05-21 DIAGNOSIS — Z79.899 HIGH RISK MEDICATION USE: ICD-10-CM

## 2025-05-21 DIAGNOSIS — G40.909 NONINTRACTABLE EPILEPSY WITHOUT STATUS EPILEPTICUS, UNSPECIFIED EPILEPSY TYPE (MULTI): ICD-10-CM

## 2025-05-21 DIAGNOSIS — E55.9 VITAMIN D DEFICIENCY, UNSPECIFIED: ICD-10-CM

## 2025-05-21 DIAGNOSIS — E53.8 VITAMIN B12 DEFICIENCY: ICD-10-CM

## 2025-05-21 DIAGNOSIS — E78.00 HYPERCHOLESTEREMIA: ICD-10-CM

## 2025-05-21 PROCEDURE — 1036F TOBACCO NON-USER: CPT | Performed by: INTERNAL MEDICINE

## 2025-05-21 PROCEDURE — 3077F SYST BP >= 140 MM HG: CPT | Performed by: INTERNAL MEDICINE

## 2025-05-21 PROCEDURE — 3080F DIAST BP >= 90 MM HG: CPT | Performed by: INTERNAL MEDICINE

## 2025-05-21 PROCEDURE — 3008F BODY MASS INDEX DOCD: CPT | Performed by: INTERNAL MEDICINE

## 2025-05-21 PROCEDURE — 99396 PREV VISIT EST AGE 40-64: CPT | Performed by: INTERNAL MEDICINE

## 2025-05-21 PROCEDURE — 99214 OFFICE O/P EST MOD 30 MIN: CPT | Performed by: INTERNAL MEDICINE

## 2025-05-21 RX ORDER — CARBAMAZEPINE 400 MG/1
400 TABLET, EXTENDED RELEASE ORAL 3 TIMES DAILY
Qty: 270 TABLET | Refills: 1 | Status: SHIPPED | OUTPATIENT
Start: 2025-05-21

## 2025-05-21 RX ORDER — LOSARTAN POTASSIUM 25 MG/1
25 TABLET ORAL DAILY
Qty: 100 TABLET | Refills: 3 | Status: SHIPPED | OUTPATIENT
Start: 2025-05-21 | End: 2026-06-25

## 2025-05-21 ASSESSMENT — ANXIETY QUESTIONNAIRES
4. TROUBLE RELAXING: NOT AT ALL
1. FEELING NERVOUS, ANXIOUS, OR ON EDGE: NOT AT ALL
5. BEING SO RESTLESS THAT IT IS HARD TO SIT STILL: NOT AT ALL
3. WORRYING TOO MUCH ABOUT DIFFERENT THINGS: NOT AT ALL
7. FEELING AFRAID AS IF SOMETHING AWFUL MIGHT HAPPEN: NOT AT ALL
GAD7 TOTAL SCORE: 0
2. NOT BEING ABLE TO STOP OR CONTROL WORRYING: NOT AT ALL
6. BECOMING EASILY ANNOYED OR IRRITABLE: NOT AT ALL

## 2025-05-21 ASSESSMENT — ENCOUNTER SYMPTOMS
ARTHRALGIAS: 0
COUGH: 0
WHEEZING: 0
DIFFICULTY URINATING: 0
FATIGUE: 0
BLOOD IN STOOL: 0
SORE THROAT: 0
UNEXPECTED WEIGHT CHANGE: 0
BRUISES/BLEEDS EASILY: 0
DIARRHEA: 0
PALPITATIONS: 0
HEADACHES: 0
ABDOMINAL PAIN: 0
DIZZINESS: 0
SINUS PAIN: 0
FEVER: 0

## 2025-05-21 ASSESSMENT — PATIENT HEALTH QUESTIONNAIRE - PHQ9
SUM OF ALL RESPONSES TO PHQ9 QUESTIONS 1 AND 2: 0
2. FEELING DOWN, DEPRESSED OR HOPELESS: NOT AT ALL
1. LITTLE INTEREST OR PLEASURE IN DOING THINGS: NOT AT ALL

## 2025-11-19 ENCOUNTER — APPOINTMENT (OUTPATIENT)
Dept: PRIMARY CARE | Facility: CLINIC | Age: 50
End: 2025-11-19
Payer: COMMERCIAL

## 2025-11-20 ENCOUNTER — APPOINTMENT (OUTPATIENT)
Dept: PRIMARY CARE | Facility: CLINIC | Age: 50
End: 2025-11-20
Payer: COMMERCIAL